# Patient Record
Sex: MALE | Race: WHITE | NOT HISPANIC OR LATINO | URBAN - METROPOLITAN AREA
[De-identification: names, ages, dates, MRNs, and addresses within clinical notes are randomized per-mention and may not be internally consistent; named-entity substitution may affect disease eponyms.]

---

## 2023-08-31 ENCOUNTER — OFFICE VISIT (OUTPATIENT)
Dept: URBAN - METROPOLITAN AREA CLINIC 80 | Facility: CLINIC | Age: 73
End: 2023-08-31
Payer: MEDICARE

## 2023-08-31 ENCOUNTER — WEB ENCOUNTER (OUTPATIENT)
Dept: URBAN - METROPOLITAN AREA CLINIC 80 | Facility: CLINIC | Age: 73
End: 2023-08-31

## 2023-08-31 ENCOUNTER — DASHBOARD ENCOUNTERS (OUTPATIENT)
Age: 73
End: 2023-08-31

## 2023-08-31 VITALS
BODY MASS INDEX: 36.88 KG/M2 | WEIGHT: 249 LBS | DIASTOLIC BLOOD PRESSURE: 73 MMHG | SYSTOLIC BLOOD PRESSURE: 164 MMHG | HEART RATE: 72 BPM | HEIGHT: 69 IN | TEMPERATURE: 97.1 F

## 2023-08-31 DIAGNOSIS — K86.2 CYST OF PANCREAS: ICD-10-CM

## 2023-08-31 DIAGNOSIS — Z80.0 FAMILY HISTORY OF PANCREATIC CANCER: ICD-10-CM

## 2023-08-31 DIAGNOSIS — Z86.010 HISTORY OF ADENOMATOUS POLYP OF COLON: ICD-10-CM

## 2023-08-31 PROBLEM — 429047008: Status: ACTIVE | Noted: 2023-08-31

## 2023-08-31 PROBLEM — 31258000: Status: ACTIVE | Noted: 2023-08-31

## 2023-08-31 PROBLEM — 429000004: Status: ACTIVE | Noted: 2023-08-31

## 2023-08-31 PROCEDURE — 99204 OFFICE O/P NEW MOD 45 MIN: CPT | Performed by: STUDENT IN AN ORGANIZED HEALTH CARE EDUCATION/TRAINING PROGRAM

## 2023-08-31 RX ORDER — ASPIRIN 325 MG/1
1 TABLET TABLET, FILM COATED ORAL ONCE A DAY
Status: ACTIVE | COMMUNITY

## 2023-08-31 NOTE — HPI-TODAY'S VISIT:
Mr. Fuentes was referred by Dr. Huy Basurto (Surgical Oncology) for evaluation of pancreatic cysts and need for endosonographic evaluation. A copy of this clinic visit will be sent to Dr. Basurto's office.  Mr. Fuentes has a history of CVA, HTN, HLD, and DM as well as pancreatic cysts thought to be IPMN on imaging. In the last 6-8 months, he had a couple episodes (2-4) where he develops nausea/vomiting in the middle of the night that persists for about 24 hours. He has some low blood sugars but this has improved since receiving Jenni blood sugar monitor. He was also having significant abdominal pain which seems to have resolved with cessation of Mobic and beginning Nexium at night. His appetite has also improved since this change.  Previous work-up of abdominal pain included a CT which revealed a 1.2 cm mass in the pancreatic head with a large renal cyst. A follow-up MRI w/ and w/o characterized the lesion as a 2.5 x 2.2 cm probable side-branch IPMN in the body of pancreas. There are smaller well-defined homogenous T2 hyperintensities along the the pancreas as well. There was no main pancreatic duct dilation. He was seen by medical oncology and surgical oncology. CA 19-9 ordered and was wnl at 10. In addition to referral to our clinic today, Dr. Basurto has ordered a repeat MRI in 3 months.  He denies any prior pancreatitis, jaundice, or acolic stools. He lost 10 lbs uninetntionally at the beginning of the year but this has stabolized. He denies any alcohol intake. No rectal bleeding but has had some constipation over the last few months.  He has a family history of pancreatic cancer in his brother who passed away from the disease at age 60. He is not aware of any other family members with pancreatic cancer or prior pancreatitis. . His grandmother had colon cancer. Mr. Fuentes had colon polyps (TA and HP) in 2008, but repeat colonoscopies were normal in 2014 and 2020.

## 2023-09-20 ENCOUNTER — TELEPHONE ENCOUNTER (OUTPATIENT)
Dept: URBAN - METROPOLITAN AREA CLINIC 80 | Facility: CLINIC | Age: 73
End: 2023-09-20

## 2023-09-29 ENCOUNTER — OFFICE VISIT (OUTPATIENT)
Dept: URBAN - METROPOLITAN AREA MEDICAL CENTER 18 | Facility: MEDICAL CENTER | Age: 73
End: 2023-09-29
Payer: MEDICARE

## 2023-09-29 DIAGNOSIS — K86.89 ACUTE PANCREATIC FLUID COLLECTION: ICD-10-CM

## 2023-09-29 DIAGNOSIS — R93.3 ABN FINDINGS-GI TRACT: ICD-10-CM

## 2023-09-29 PROCEDURE — 43242 EGD US FINE NEEDLE BX/ASPIR: CPT | Performed by: STUDENT IN AN ORGANIZED HEALTH CARE EDUCATION/TRAINING PROGRAM

## 2023-09-29 RX ORDER — ASPIRIN 325 MG/1
1 TABLET TABLET, FILM COATED ORAL ONCE A DAY
Status: ACTIVE | COMMUNITY

## 2023-10-09 ENCOUNTER — OFFICE VISIT (OUTPATIENT)
Dept: URBAN - METROPOLITAN AREA MEDICAL CENTER 28 | Facility: MEDICAL CENTER | Age: 73
End: 2023-10-09